# Patient Record
Sex: FEMALE | Race: ASIAN | Employment: STUDENT | URBAN - METROPOLITAN AREA
[De-identification: names, ages, dates, MRNs, and addresses within clinical notes are randomized per-mention and may not be internally consistent; named-entity substitution may affect disease eponyms.]

---

## 2024-04-18 ENCOUNTER — HOSPITAL ENCOUNTER (EMERGENCY)
Facility: HOSPITAL | Age: 24
Discharge: HOME | End: 2024-04-18
Attending: EMERGENCY MEDICINE

## 2024-04-18 VITALS
OXYGEN SATURATION: 99 % | DIASTOLIC BLOOD PRESSURE: 81 MMHG | HEIGHT: 66 IN | HEART RATE: 79 BPM | WEIGHT: 130.06 LBS | BODY MASS INDEX: 20.9 KG/M2 | SYSTOLIC BLOOD PRESSURE: 127 MMHG | RESPIRATION RATE: 18 BRPM | TEMPERATURE: 98 F

## 2024-04-18 DIAGNOSIS — L24.9 IRRITANT CONTACT DERMATITIS, UNSPECIFIED TRIGGER: Primary | ICD-10-CM

## 2024-04-18 PROCEDURE — 99284 EMERGENCY DEPT VISIT MOD MDM: CPT

## 2024-04-18 PROCEDURE — 99283 EMERGENCY DEPT VISIT LOW MDM: CPT

## 2024-04-18 RX ORDER — CETIRIZINE HYDROCHLORIDE 10 MG/1
10 TABLET ORAL DAILY
Qty: 30 TABLET | Refills: 0 | Status: SHIPPED | OUTPATIENT
Start: 2024-04-18 | End: 2024-05-18

## 2024-04-18 RX ORDER — PREDNISONE 20 MG/1
40 TABLET ORAL ONCE
Status: COMPLETED | OUTPATIENT
Start: 2024-04-18 | End: 2024-04-18

## 2024-04-18 RX ORDER — PREDNISONE 20 MG/1
20 TABLET ORAL DAILY
Qty: 5 TABLET | Refills: 0 | Status: SHIPPED | OUTPATIENT
Start: 2024-04-18 | End: 2024-04-23

## 2024-04-18 NOTE — ED INITIAL ASSESSMENT (HPI)
Pt ambulatory to ER, states skin around neck and face started itching and swelling about 5 days ago. States itching worsened last night. States took Benadryl 2 days ago and took Tylenol yesterday evening because she believed she had fever. Denies SOB, tongue swelling or itching. Mild redness noted to face

## 2024-04-18 NOTE — ED PROVIDER NOTES
Patient Seen in: [facility] Emergency Department      History     Chief Complaint   Patient presents with    Skin Problem     Itchiness/facial swelling starting 5 days ago.      Stated Complaint: +facial swelling and itching starting x5 days ago. Denies ISAIAS    Subjective:   HPI    23-year-old female presents to the emergency department with redness and itching as well as facial swelling for 5 days.  Patient had been using a snail lotion as well as a strawberry facial wash.  She states she has been using them for a while but then was having a patch of dryness.  She then went and spoke to a pharmacist who advised that she use Eucerin cream.  She states that seem to make it worse.  She had taken Benadryl as well and there is no resolution and subsequently came to the emergency department.  She does have a history of seasonal allergies.  She had another area on her abdomen that has resolved.    Objective:   History reviewed. No pertinent past medical history.           History reviewed. No pertinent surgical history.             Social History     Socioeconomic History    Marital status: Single   Tobacco Use    Smoking status: Never    Smokeless tobacco: Never              Review of Systems   All other systems reviewed and are negative.      Positive for stated complaint: +facial swelling and itching starting x5 days ago. Denies ISAIAS  Other systems are as noted in HPI.  Constitutional and vital signs reviewed.      All other systems reviewed and negative except as noted above.    Physical Exam     ED Triage Vitals [04/18/24 1026]   /81   Pulse 79   Resp 18   Temp 97.8 °F (36.6 °C)   Temp src Temporal   SpO2 99 %   O2 Device None (Room air)       Current:/81   Pulse 79   Temp 97.8 °F (36.6 °C) (Temporal)   Resp 18   Ht 167.6 cm (5' 6\")   Wt 59 kg   LMP 04/02/2024 (Approximate)   SpO2 99%   BMI 20.99 kg/m²         Physical Exam  Vitals and nursing note reviewed.   Constitutional:        Appearance: She is well-developed.   HENT:      Head: Normocephalic and atraumatic.   Cardiovascular:      Rate and Rhythm: Normal rate and regular rhythm.      Heart sounds: Normal heart sounds.   Pulmonary:      Effort: Pulmonary effort is normal.      Breath sounds: Normal breath sounds. No stridor. No wheezing.   Musculoskeletal:         General: No tenderness. Normal range of motion.      Cervical back: Normal range of motion and neck supple.   Lymphadenopathy:      Cervical: No cervical adenopathy.   Skin:     General: Skin is warm and dry.      Coloration: Skin is not pale.      Findings: Erythema present.      Comments: Erythema and dryness of face consistent with a contact dermatitis   Neurological:      General: No focal deficit present.      Mental Status: She is alert and oriented to person, place, and time.      Cranial Nerves: No cranial nerve deficit.      Coordination: Coordination normal.              ED Course   Labs Reviewed - No data to display                   MDM      Patient's history and exam is consistent with a contact dermatitis.  We discussed care and treatment.  I advised that she minimize putting any agents on her face and to keep things limited to mild soaps.  I would actually have her discontinue all lotions.  As well as due to a mild soap such as Dove rather than her face wash that is scented.  She will be given Zyrtec and a short course of prednisone.  She may do some cool compresses to help with itching as well.  She was discharged in good condition                                   Medical Decision Making      Disposition and Plan     Clinical Impression:  1. Irritant contact dermatitis, unspecified trigger         Disposition:  Discharge  4/18/2024 11:26 am    Follow-up:  Frankie Avalos MD  [id number]  [facility] [address]  [phone]    Schedule an appointment as soon as possible for a visit  As needed          Medications Prescribed:  Discharge Medication List as  of 4/18/2024 11:30 AM        START taking these medications    Details   predniSONE 20 MG Oral Tab Take 1 tablet (20 mg total) by mouth daily for 5 days., Normal, Disp-5 tablet, R-0      cetirizine 10 MG Oral Tab Take 1 tablet (10 mg total) by mouth daily., Normal, Disp-30 tablet, R-0

## 2024-04-18 NOTE — DISCHARGE INSTRUCTIONS
Avoid using any lotions on your face.  I would advise that you use a very mild face wash such as Dove.  Minimize heat on your face.  Do not over wash your face.  May use cool compresses